# Patient Record
Sex: MALE | Race: WHITE | NOT HISPANIC OR LATINO | Employment: OTHER | URBAN - METROPOLITAN AREA
[De-identification: names, ages, dates, MRNs, and addresses within clinical notes are randomized per-mention and may not be internally consistent; named-entity substitution may affect disease eponyms.]

---

## 2017-02-07 ENCOUNTER — TELEPHONE (OUTPATIENT)
Dept: INTERNAL MEDICINE | Facility: CLINIC | Age: 47
End: 2017-02-07

## 2017-02-07 NOTE — TELEPHONE ENCOUNTER
Notified pt's wife, Alia, that fasting lab work was scheduled for 3/28/17 and that appt was scheduled a year ago.  Alia requested lab work be rescheduled to 3/7/17.  Labs rescheduled per request.

## 2017-02-07 NOTE — TELEPHONE ENCOUNTER
----- Message from Nadia Duenas sent at 2/7/2017  2:12 PM CST -----  Contact: wife/Alia  Please call pt wife at [phone], states pt have appt on 3/14 and pt would like an order to get lab on 3/7.

## 2017-03-07 ENCOUNTER — LAB VISIT (OUTPATIENT)
Dept: LAB | Facility: HOSPITAL | Age: 47
End: 2017-03-07
Attending: INTERNAL MEDICINE
Payer: COMMERCIAL

## 2017-03-07 DIAGNOSIS — Z00.00 ROUTINE GENERAL MEDICAL EXAMINATION AT A HEALTH CARE FACILITY: ICD-10-CM

## 2017-03-07 LAB
ALBUMIN SERPL BCP-MCNC: 4.2 G/DL
ALP SERPL-CCNC: 69 U/L
ALT SERPL W/O P-5'-P-CCNC: 29 U/L
ANION GAP SERPL CALC-SCNC: 7 MMOL/L
AST SERPL-CCNC: 18 U/L
BASOPHILS # BLD AUTO: 0.01 K/UL
BASOPHILS NFR BLD: 0.1 %
BILIRUB SERPL-MCNC: 0.9 MG/DL
BUN SERPL-MCNC: 15 MG/DL
CALCIUM SERPL-MCNC: 9.2 MG/DL
CHLORIDE SERPL-SCNC: 108 MMOL/L
CHOLEST/HDLC SERPL: 4.1 {RATIO}
CO2 SERPL-SCNC: 24 MMOL/L
COMPLEXED PSA SERPL-MCNC: 0.4 NG/ML
CREAT SERPL-MCNC: 1.1 MG/DL
DIFFERENTIAL METHOD: NORMAL
EOSINOPHIL # BLD AUTO: 0.1 K/UL
EOSINOPHIL NFR BLD: 1.4 %
ERYTHROCYTE [DISTWIDTH] IN BLOOD BY AUTOMATED COUNT: 12.5 %
EST. GFR  (AFRICAN AMERICAN): >60 ML/MIN/1.73 M^2
EST. GFR  (NON AFRICAN AMERICAN): >60 ML/MIN/1.73 M^2
GLUCOSE SERPL-MCNC: 99 MG/DL
HCT VFR BLD AUTO: 43.3 %
HDL/CHOLESTEROL RATIO: 24.6 %
HDLC SERPL-MCNC: 167 MG/DL
HDLC SERPL-MCNC: 41 MG/DL
HGB BLD-MCNC: 14.9 G/DL
LDLC SERPL CALC-MCNC: 116 MG/DL
LYMPHOCYTES # BLD AUTO: 2.8 K/UL
LYMPHOCYTES NFR BLD: 40.4 %
MCH RBC QN AUTO: 30.5 PG
MCHC RBC AUTO-ENTMCNC: 34.4 %
MCV RBC AUTO: 89 FL
MONOCYTES # BLD AUTO: 0.4 K/UL
MONOCYTES NFR BLD: 5.7 %
NEUTROPHILS # BLD AUTO: 3.7 K/UL
NEUTROPHILS NFR BLD: 52.4 %
NONHDLC SERPL-MCNC: 126 MG/DL
PLATELET # BLD AUTO: 259 K/UL
PMV BLD AUTO: 9.5 FL
POTASSIUM SERPL-SCNC: 4.5 MMOL/L
PROT SERPL-MCNC: 7.3 G/DL
RBC # BLD AUTO: 4.88 M/UL
SODIUM SERPL-SCNC: 139 MMOL/L
TRIGL SERPL-MCNC: 50 MG/DL
WBC # BLD AUTO: 7 K/UL

## 2017-03-07 PROCEDURE — 80053 COMPREHEN METABOLIC PANEL: CPT

## 2017-03-07 PROCEDURE — 80061 LIPID PANEL: CPT

## 2017-03-07 PROCEDURE — 84153 ASSAY OF PSA TOTAL: CPT

## 2017-03-07 PROCEDURE — 85025 COMPLETE CBC W/AUTO DIFF WBC: CPT

## 2017-03-07 PROCEDURE — 36415 COLL VENOUS BLD VENIPUNCTURE: CPT | Mod: PO

## 2017-03-14 ENCOUNTER — OFFICE VISIT (OUTPATIENT)
Dept: INTERNAL MEDICINE | Facility: CLINIC | Age: 47
End: 2017-03-14
Payer: COMMERCIAL

## 2017-03-14 VITALS
HEART RATE: 61 BPM | WEIGHT: 191.56 LBS | DIASTOLIC BLOOD PRESSURE: 80 MMHG | HEIGHT: 72 IN | SYSTOLIC BLOOD PRESSURE: 130 MMHG | TEMPERATURE: 97 F | OXYGEN SATURATION: 98 % | BODY MASS INDEX: 25.95 KG/M2

## 2017-03-14 DIAGNOSIS — Z00.00 ROUTINE GENERAL MEDICAL EXAMINATION AT A HEALTH CARE FACILITY: Primary | ICD-10-CM

## 2017-03-14 PROCEDURE — 99999 PR PBB SHADOW E&M-EST. PATIENT-LVL III: CPT | Mod: PBBFAC,,, | Performed by: INTERNAL MEDICINE

## 2017-03-14 PROCEDURE — 99396 PREV VISIT EST AGE 40-64: CPT | Mod: S$GLB,,, | Performed by: INTERNAL MEDICINE

## 2017-03-14 NOTE — PROGRESS NOTES
Subjective:      Patient ID: Darline Foster Jr. is a 47 y.o. male.    Chief Complaint: Follow-up    HPI Comments: 46 yo with There is no problem list on file for this patient.  History reviewed. No pertinent past medical history.    Here today for annual exam.  Never smoked. No f/h of colon cancer.  He is feeling well today and in his usual state of health.        Review of Systems   Constitutional: Negative for chills and fever.   HENT: Negative for ear pain and sore throat.    Respiratory: Negative for cough.    Cardiovascular: Negative for chest pain.   Gastrointestinal: Negative for abdominal pain and blood in stool.   Genitourinary: Negative for dysuria and hematuria.   Neurological: Negative for seizures and syncope.     Objective:   /80 (BP Location: Right arm, Patient Position: Sitting)  Pulse 61  Temp 96.5 °F (35.8 °C) (Tympanic)   Ht 6' (1.829 m)  Wt 86.9 kg (191 lb 9.3 oz)  SpO2 98%  BMI 25.98 kg/m2    Physical Exam   Constitutional: He is oriented to person, place, and time. He appears well-developed and well-nourished. No distress.   HENT:   Head: Normocephalic and atraumatic.   Mouth/Throat: Oropharynx is clear and moist.   Eyes: EOM are normal. Pupils are equal, round, and reactive to light.   Neck: Neck supple. No thyromegaly present.   Cardiovascular: Normal rate and regular rhythm.    Pulmonary/Chest: Breath sounds normal. He has no wheezes. He has no rales.   Abdominal: Soft. Bowel sounds are normal. There is no tenderness.   Musculoskeletal: He exhibits no edema.   Lymphadenopathy:     He has no cervical adenopathy.   Neurological: He is alert and oriented to person, place, and time.   Skin: Skin is warm and dry.   Psychiatric: He has a normal mood and affect. His behavior is normal.     Lab Visit on 03/07/2017   Component Date Value Ref Range Status    Cholesterol 03/07/2017 167  120 - 199 mg/dL Final    Comment: The National Cholesterol Education Program (NCEP) has set the  following  guidelines (reference ranges) for Cholesterol:  Optimal.....................<200 mg/dL  Borderline High.............200-239 mg/dL  High........................> or = 240 mg/dL      Triglycerides 03/07/2017 50  30 - 150 mg/dL Final    Comment: The National Cholesterol Education Program (NCEP) has set the  following guidelines (reference values) for triglycerides:  Normal......................<150 mg/dL  Borderline High.............150-199 mg/dL  High........................200-499 mg/dL      HDL 03/07/2017 41  40 - 75 mg/dL Final    Comment: The National Cholesterol Education Program (NCEP) has set the  following guidelines (reference values) for HDL Cholesterol:  Low...............<40 mg/dL  Optimal...........>60 mg/dL      LDL Cholesterol 03/07/2017 116.0  63.0 - 159.0 mg/dL Final    Comment: The National Cholesterol Education Program (NCEP) has set the  following guidelines (reference values) for LDL Cholesterol:  Optimal.......................<130 mg/dL  Borderline High...............130-159 mg/dL  High..........................160-189 mg/dL  Very High.....................>190 mg/dL      HDL/Chol Ratio 03/07/2017 24.6  20.0 - 50.0 % Final    Total Cholesterol/HDL Ratio 03/07/2017 4.1  2.0 - 5.0 Final    Non-HDL Cholesterol 03/07/2017 126  mg/dL Final    Comment: Risk category and Non-HDL cholesterol goals:  Coronary heart disease (CHD)or equivalent (10-year risk of CHD >20%):  Non-HDL cholesterol goal     <130 mg/dL  Two or more CHD risk factors and 10-year risk of CHD <= 20%:  Non-HDL cholesterol goal     <160 mg/dL  0 to 1 CHD risk factor:  Non-HDL cholesterol goal     <190 mg/dL      PSA, SCREEN 03/07/2017 0.40  0.00 - 4.00 ng/mL Final    Comment: PSA Expected levels:  Hormonal Therapy: <0.05 ng/ml  Prostatectomy: <0.01 ng/ml  Radiation Therapy: <1.00 ng/ml      WBC 03/07/2017 7.00  3.90 - 12.70 K/uL Final    RBC 03/07/2017 4.88  4.60 - 6.20 M/uL Final    Hemoglobin 03/07/2017 14.9  14.0 - 18.0 g/dL  Final    Hematocrit 03/07/2017 43.3  40.0 - 54.0 % Final    MCV 03/07/2017 89  82 - 98 fL Final    MCH 03/07/2017 30.5  27.0 - 31.0 pg Final    MCHC 03/07/2017 34.4  32.0 - 36.0 % Final    RDW 03/07/2017 12.5  11.5 - 14.5 % Final    Platelets 03/07/2017 259  150 - 350 K/uL Final    MPV 03/07/2017 9.5  9.2 - 12.9 fL Final    Gran # 03/07/2017 3.7  1.8 - 7.7 K/uL Final    Lymph # 03/07/2017 2.8  1.0 - 4.8 K/uL Final    Mono # 03/07/2017 0.4  0.3 - 1.0 K/uL Final    Eos # 03/07/2017 0.1  0.0 - 0.5 K/uL Final    Baso # 03/07/2017 0.01  0.00 - 0.20 K/uL Final    Gran% 03/07/2017 52.4  38.0 - 73.0 % Final    Lymph% 03/07/2017 40.4  18.0 - 48.0 % Final    Mono% 03/07/2017 5.7  4.0 - 15.0 % Final    Eosinophil% 03/07/2017 1.4  0.0 - 8.0 % Final    Basophil% 03/07/2017 0.1  0.0 - 1.9 % Final    Differential Method 03/07/2017 Automated   Final    Sodium 03/07/2017 139  136 - 145 mmol/L Final    Potassium 03/07/2017 4.5  3.5 - 5.1 mmol/L Final    Chloride 03/07/2017 108  95 - 110 mmol/L Final    CO2 03/07/2017 24  23 - 29 mmol/L Final    Glucose 03/07/2017 99  70 - 110 mg/dL Final    BUN, Bld 03/07/2017 15  6 - 20 mg/dL Final    Creatinine 03/07/2017 1.1  0.5 - 1.4 mg/dL Final    Calcium 03/07/2017 9.2  8.7 - 10.5 mg/dL Final    Total Protein 03/07/2017 7.3  6.0 - 8.4 g/dL Final    Albumin 03/07/2017 4.2  3.5 - 5.2 g/dL Final    Total Bilirubin 03/07/2017 0.9  0.1 - 1.0 mg/dL Final    Comment: For infants and newborns, interpretation of results should be based  on gestational age, weight and in agreement with clinical  observations.  Premature Infant recommended reference ranges:  Up to 24 hours.............<8.0 mg/dL  Up to 48 hours............<12.0 mg/dL  3-5 days..................<15.0 mg/dL  6-29 days.................<15.0 mg/dL      Alkaline Phosphatase 03/07/2017 69  55 - 135 U/L Final    AST 03/07/2017 18  10 - 40 U/L Final    ALT 03/07/2017 29  10 - 44 U/L Final    Anion Gap 03/07/2017  7* 8 - 16 mmol/L Final    eGFR if African American 03/07/2017 >60.0  >60 mL/min/1.73 m^2 Final    eGFR if non African American 03/07/2017 >60.0  >60 mL/min/1.73 m^2 Final    Comment: Calculation used to obtain the estimated glomerular filtration  rate (eGFR) is the CKD-EPI equation. Since race is unknown   in our information system, the eGFR values for   -American and Non--American patients are given   for each creatinine result.         Assessment:     1. Routine general medical examination at a health care facility      Plan:   Routine general medical examination at a health care facility  -     Lipid panel; Future; Expected date: 3/14/18  -     CBC auto differential; Future; Expected date: 3/14/18  -     Comprehensive metabolic panel; Future; Expected date: 3/14/18  -     PSA, Screening; Future; Expected date: 3/14/18  -     TSH; Future; Expected date: 3/14/18  -     Vitamin D; Future; Expected date: 3/14/18     heart healthy diet and regular exercise  Health maintenance card reviewed with patient          Return in about 1 year (around 3/14/2018), or if symptoms worsen or fail to improve.

## 2017-03-14 NOTE — MR AVS SNAPSHOT
[facility]  [address] [place] [address]  Phone: [phone]  Fax: [phone]                  Darline Foster Jr.   3/14/2017 9:00 AM   Office Visit    Description:  Male : 1970   Provider:  Javier Mobley MD   Department:  [facility]           Reason for Visit     Follow-up           Diagnoses this Visit        Comments    Routine general medical examination at a health care facility    -  Primary            To Do List           Future Appointments        Provider Department Dept Phone    3/6/2018 7:35 AM LABORATORY, [facility] [phone]      Goals (5 Years of Data)     None      Follow-Up and Disposition     Return in about 1 year (around 3/14/2018), or if symptoms worsen or fail to improve.      [facility]     [facility] -  Assistance  Registered nurses in the [facility] provide clinical advisement, health education, appointment booking, and other advisory services.  Call for this free service at [phone].             Medications           Message regarding Medications     Verify the changes and/or additions to your medication regime listed below are the same as discussed with your clinician today.  If any of these changes or additions are incorrect, please notify your healthcare provider.        STOP taking these medications     clindamycin (CLEOCIN) 300 MG capsule Take 1 capsule (300 mg total) by mouth 3 (three) times daily.           Verify that the below list of medications is an accurate representation of the medications you are currently taking.  If none reported, the list may be blank. If incorrect, please contact your healthcare provider. Carry this list with you in case of emergency.           Current Medications            Clinical Reference Information           Your Vitals Were     BP Pulse Temp Height Weight SpO2    130/80 (BP Location: Right arm, Patient Position:  Sitting) 61 96.5 °F (35.8 °C) (Tympanic) 6' (1.829 m) 86.9 kg (191 lb 9.3 oz) 98%    BMI                25.98 kg/m2          Blood Pressure          Most Recent Value    BP  130/80      Allergies as of 3/14/2017     Pcn [Penicillins]      Immunizations Administered on Date of Encounter - 3/14/2017     None      Orders Placed During Today's Visit     Future Labs/Procedures Expected by Expires    CBC auto differential  3/14/2018 4/18/2018    Comprehensive metabolic panel  3/14/2018 4/18/2018    Lipid panel  3/14/2018 4/18/2018    PSA, Screening  3/14/2018 3/14/2019    TSH  3/14/2018 4/18/2018    Vitamin D  3/14/2018 (Approximate) 4/18/2018      Language Assistance Services     ATTENTION: Language assistance services are available, free of charge. Please call [phone].      ATENCIÓN: Si habla español, tiene a hernandez disposición servicios gratuitos de asistencia lingüística. Llame al [phone].     CHÚ Ý: N?u b?n nói Ti?ng Vi?t, có các d?ch v? h? tr? ngôn ng? mi?n phí dành cho b?n. G?i s? [phone].         [facility] complies with applicable Federal civil rights laws and does not discriminate on the basis of race, color, national origin, age, disability, or sex.

## 2018-03-13 ENCOUNTER — LAB VISIT (OUTPATIENT)
Dept: LAB | Facility: HOSPITAL | Age: 48
End: 2018-03-13
Attending: INTERNAL MEDICINE
Payer: COMMERCIAL

## 2018-03-13 DIAGNOSIS — Z00.00 ROUTINE GENERAL MEDICAL EXAMINATION AT A HEALTH CARE FACILITY: ICD-10-CM

## 2018-03-13 LAB
25(OH)D3+25(OH)D2 SERPL-MCNC: 33 NG/ML
ALBUMIN SERPL BCP-MCNC: 4.3 G/DL
ALP SERPL-CCNC: 70 U/L
ALT SERPL W/O P-5'-P-CCNC: 41 U/L
ANION GAP SERPL CALC-SCNC: 7 MMOL/L
AST SERPL-CCNC: 27 U/L
BASOPHILS # BLD AUTO: 0.03 K/UL
BASOPHILS NFR BLD: 0.4 %
BILIRUB SERPL-MCNC: 1.4 MG/DL
BUN SERPL-MCNC: 17 MG/DL
CALCIUM SERPL-MCNC: 9.8 MG/DL
CHLORIDE SERPL-SCNC: 107 MMOL/L
CHOLEST SERPL-MCNC: 138 MG/DL
CHOLEST/HDLC SERPL: 3.2 {RATIO}
CO2 SERPL-SCNC: 26 MMOL/L
COMPLEXED PSA SERPL-MCNC: 0.34 NG/ML
CREAT SERPL-MCNC: 1.3 MG/DL
DIFFERENTIAL METHOD: NORMAL
EOSINOPHIL # BLD AUTO: 0.3 K/UL
EOSINOPHIL NFR BLD: 3.8 %
ERYTHROCYTE [DISTWIDTH] IN BLOOD BY AUTOMATED COUNT: 12.1 %
EST. GFR  (AFRICAN AMERICAN): >60 ML/MIN/1.73 M^2
EST. GFR  (NON AFRICAN AMERICAN): >60 ML/MIN/1.73 M^2
GLUCOSE SERPL-MCNC: 94 MG/DL
HCT VFR BLD AUTO: 43.7 %
HDLC SERPL-MCNC: 43 MG/DL
HDLC SERPL: 31.2 %
HGB BLD-MCNC: 14.8 G/DL
IMM GRANULOCYTES # BLD AUTO: 0.01 K/UL
IMM GRANULOCYTES NFR BLD AUTO: 0.1 %
LDLC SERPL CALC-MCNC: 82.4 MG/DL
LYMPHOCYTES # BLD AUTO: 2.6 K/UL
LYMPHOCYTES NFR BLD: 38.6 %
MCH RBC QN AUTO: 30.6 PG
MCHC RBC AUTO-ENTMCNC: 33.9 G/DL
MCV RBC AUTO: 90 FL
MONOCYTES # BLD AUTO: 0.5 K/UL
MONOCYTES NFR BLD: 6.9 %
NEUTROPHILS # BLD AUTO: 3.4 K/UL
NEUTROPHILS NFR BLD: 50.2 %
NONHDLC SERPL-MCNC: 95 MG/DL
NRBC BLD-RTO: 0 /100 WBC
PLATELET # BLD AUTO: 249 K/UL
PMV BLD AUTO: 9.5 FL
POTASSIUM SERPL-SCNC: 4.6 MMOL/L
PROT SERPL-MCNC: 7.4 G/DL
RBC # BLD AUTO: 4.84 M/UL
SODIUM SERPL-SCNC: 140 MMOL/L
T4 FREE SERPL-MCNC: 1.05 NG/DL
TRIGL SERPL-MCNC: 63 MG/DL
TSH SERPL DL<=0.005 MIU/L-ACNC: 5.93 UIU/ML
WBC # BLD AUTO: 6.84 K/UL

## 2018-03-13 PROCEDURE — 36415 COLL VENOUS BLD VENIPUNCTURE: CPT | Mod: PO

## 2018-03-13 PROCEDURE — 84439 ASSAY OF FREE THYROXINE: CPT

## 2018-03-13 PROCEDURE — 80053 COMPREHEN METABOLIC PANEL: CPT

## 2018-03-13 PROCEDURE — 82306 VITAMIN D 25 HYDROXY: CPT

## 2018-03-13 PROCEDURE — 84443 ASSAY THYROID STIM HORMONE: CPT

## 2018-03-13 PROCEDURE — 80061 LIPID PANEL: CPT

## 2018-03-13 PROCEDURE — 85025 COMPLETE CBC W/AUTO DIFF WBC: CPT

## 2018-03-13 PROCEDURE — 84153 ASSAY OF PSA TOTAL: CPT
